# Patient Record
Sex: MALE | Race: WHITE | NOT HISPANIC OR LATINO | Employment: OTHER | URBAN - METROPOLITAN AREA
[De-identification: names, ages, dates, MRNs, and addresses within clinical notes are randomized per-mention and may not be internally consistent; named-entity substitution may affect disease eponyms.]

---

## 2017-01-01 ENCOUNTER — ANTI-COAG VISIT (OUTPATIENT)
Dept: CARDIOLOGY | Facility: CLINIC | Age: 82
End: 2017-01-01

## 2017-01-01 ENCOUNTER — CLINICAL SUPPORT (OUTPATIENT)
Dept: CARDIOLOGY | Facility: CLINIC | Age: 82
End: 2017-01-01
Payer: MEDICARE

## 2017-01-01 ENCOUNTER — LAB VISIT (OUTPATIENT)
Dept: LAB | Facility: HOSPITAL | Age: 82
End: 2017-01-01
Attending: INTERNAL MEDICINE
Payer: MEDICARE

## 2017-01-01 ENCOUNTER — HOSPITAL ENCOUNTER (OUTPATIENT)
Dept: RADIOLOGY | Facility: HOSPITAL | Age: 82
Discharge: HOME | End: 2017-03-21
Attending: UROLOGY
Payer: MEDICARE

## 2017-01-01 ENCOUNTER — TELEPHONE (OUTPATIENT)
Dept: UROLOGY | Facility: CLINIC | Age: 82
End: 2017-01-01

## 2017-01-01 ENCOUNTER — TELEPHONE (OUTPATIENT)
Dept: CARDIOLOGY | Facility: CLINIC | Age: 82
End: 2017-01-01

## 2017-01-01 ENCOUNTER — OFFICE VISIT (OUTPATIENT)
Dept: CARDIOLOGY | Facility: CLINIC | Age: 82
End: 2017-01-01
Payer: MEDICARE

## 2017-01-01 ENCOUNTER — OFFICE VISIT (OUTPATIENT)
Dept: UROLOGY | Facility: CLINIC | Age: 82
End: 2017-01-01
Attending: UROLOGY
Payer: MEDICARE

## 2017-01-01 VITALS
SYSTOLIC BLOOD PRESSURE: 133 MMHG | HEART RATE: 68 BPM | BODY MASS INDEX: 20.57 KG/M2 | HEIGHT: 72 IN | DIASTOLIC BLOOD PRESSURE: 60 MMHG | WEIGHT: 151.88 LBS

## 2017-01-01 VITALS
HEART RATE: 69 BPM | SYSTOLIC BLOOD PRESSURE: 159 MMHG | WEIGHT: 155.63 LBS | HEIGHT: 72 IN | BODY MASS INDEX: 21.08 KG/M2 | DIASTOLIC BLOOD PRESSURE: 59 MMHG

## 2017-01-01 DIAGNOSIS — Z51.81 ANTICOAGULATION MANAGEMENT ENCOUNTER: ICD-10-CM

## 2017-01-01 DIAGNOSIS — Z79.01 ANTICOAGULATION MANAGEMENT ENCOUNTER: ICD-10-CM

## 2017-01-01 DIAGNOSIS — I71.40 ABDOMINAL AORTIC ANEURYSM (AAA) WITHOUT RUPTURE: ICD-10-CM

## 2017-01-01 DIAGNOSIS — N28.1 RENAL CYST: ICD-10-CM

## 2017-01-01 DIAGNOSIS — Z95.3 HISTORY OF AORTIC VALVE REPLACEMENT WITH BIOPROSTHETIC VALVE: ICD-10-CM

## 2017-01-01 DIAGNOSIS — I10 ESSENTIAL HYPERTENSION: ICD-10-CM

## 2017-01-01 DIAGNOSIS — I25.10 CORONARY ARTERY DISEASE WITHOUT ANGINA PECTORIS, UNSPECIFIED VESSEL OR LESION TYPE, UNSPECIFIED WHETHER NATIVE OR TRANSPLANTED HEART: ICD-10-CM

## 2017-01-01 DIAGNOSIS — Z95.0 CARDIAC PACEMAKER IN SITU: ICD-10-CM

## 2017-01-01 DIAGNOSIS — E78.00 HYPERCHOLESTEROLEMIA: ICD-10-CM

## 2017-01-01 DIAGNOSIS — F68.8: ICD-10-CM

## 2017-01-01 DIAGNOSIS — I65.23 BILATERAL CAROTID ARTERY STENOSIS: ICD-10-CM

## 2017-01-01 DIAGNOSIS — I48.20 CHRONIC ATRIAL FIBRILLATION: ICD-10-CM

## 2017-01-01 DIAGNOSIS — I25.10 CORONARY ARTERY DISEASE INVOLVING NATIVE CORONARY ARTERY OF NATIVE HEART WITHOUT ANGINA PECTORIS: ICD-10-CM

## 2017-01-01 DIAGNOSIS — I48.19 PERSISTENT ATRIAL FIBRILLATION: ICD-10-CM

## 2017-01-01 DIAGNOSIS — I50.812 CHRONIC RIGHT-SIDED CONGESTIVE HEART FAILURE: ICD-10-CM

## 2017-01-01 DIAGNOSIS — Z95.5 STENTED CORONARY ARTERY: ICD-10-CM

## 2017-01-01 DIAGNOSIS — N18.6 END STAGE KIDNEY DISEASE: ICD-10-CM

## 2017-01-01 DIAGNOSIS — Z95.5 STENTED CORONARY ARTERY: Primary | ICD-10-CM

## 2017-01-01 DIAGNOSIS — I48.19 PERSISTENT ATRIAL FIBRILLATION: Chronic | ICD-10-CM

## 2017-01-01 DIAGNOSIS — I48.20 CHRONIC ATRIAL FIBRILLATION: Primary | ICD-10-CM

## 2017-01-01 DIAGNOSIS — N40.0 BENIGN NON-NODULAR PROSTATIC HYPERPLASIA WITHOUT LOWER URINARY TRACT SYMPTOMS: Primary | ICD-10-CM

## 2017-01-01 DIAGNOSIS — Z95.0 CARDIAC PACEMAKER IN SITU: Primary | ICD-10-CM

## 2017-01-01 LAB
ALBUMIN SERPL BCP-MCNC: 3.4 G/DL
ALP SERPL-CCNC: 132 U/L
ALT SERPL W/O P-5'-P-CCNC: 15 U/L
ANION GAP SERPL CALC-SCNC: 14 MMOL/L
AORTIC VALVE STENOSIS: ABNORMAL
AST SERPL-CCNC: 23 U/L
BASOPHILS # BLD AUTO: 0.04 K/UL
BASOPHILS NFR BLD: 0.7 %
BILIRUB SERPL-MCNC: 0.7 MG/DL
BUN SERPL-MCNC: 54 MG/DL
CALCIUM SERPL-MCNC: 9.3 MG/DL
CHLORIDE SERPL-SCNC: 96 MMOL/L
CHOLEST/HDLC SERPL: 1.8 {RATIO}
CO2 SERPL-SCNC: 28 MMOL/L
CREAT SERPL-MCNC: 7.1 MG/DL
DIFFERENTIAL METHOD: ABNORMAL
EOSINOPHIL # BLD AUTO: 0.1 K/UL
EOSINOPHIL NFR BLD: 2.1 %
ERYTHROCYTE [DISTWIDTH] IN BLOOD BY AUTOMATED COUNT: 15.4 %
EST. GFR  (AFRICAN AMERICAN): 7.5 ML/MIN/1.73 M^2
EST. GFR  (NON AFRICAN AMERICAN): 6.5 ML/MIN/1.73 M^2
ESTIMATED PA SYSTOLIC PRESSURE: 41.44
GLUCOSE SERPL-MCNC: 88 MG/DL
HCT VFR BLD AUTO: 33 %
HDL/CHOLESTEROL RATIO: 55.4 %
HDLC SERPL-MCNC: 112 MG/DL
HDLC SERPL-MCNC: 62 MG/DL
HGB BLD-MCNC: 10.5 G/DL
INR PPP: 1.7 (ref 2–3)
INR PPP: 1.9 (ref 2–3)
INR PPP: 2.3 (ref 2–3)
INR PPP: 2.8 (ref 2–3)
LDLC SERPL CALC-MCNC: 40.4 MG/DL
LYMPHOCYTES # BLD AUTO: 1.7 K/UL
LYMPHOCYTES NFR BLD: 27.3 %
MCH RBC QN AUTO: 33.5 PG
MCHC RBC AUTO-ENTMCNC: 31.8 %
MCV RBC AUTO: 105 FL
MITRAL VALVE MOBILITY: ABNORMAL
MITRAL VALVE REGURGITATION: ABNORMAL
MONOCYTES # BLD AUTO: 0.9 K/UL
MONOCYTES NFR BLD: 13.9 %
NEUTROPHILS # BLD AUTO: 3.4 K/UL
NEUTROPHILS NFR BLD: 55.8 %
NONHDLC SERPL-MCNC: 50 MG/DL
PLATELET # BLD AUTO: 138 K/UL
PMV BLD AUTO: 11 FL
POTASSIUM SERPL-SCNC: 5.2 MMOL/L
PROT SERPL-MCNC: 7.6 G/DL
RBC # BLD AUTO: 3.13 M/UL
RETIRED EF AND QEF - SEE NOTES: 55 (ref 55–65)
SODIUM SERPL-SCNC: 138 MMOL/L
TRIGL SERPL-MCNC: 48 MG/DL
TSH SERPL DL<=0.005 MIU/L-ACNC: 1.7 UIU/ML
WBC # BLD AUTO: 6.11 K/UL

## 2017-01-01 PROCEDURE — 76770 US EXAM ABDO BACK WALL COMP: CPT | Mod: 26,,, | Performed by: RADIOLOGY

## 2017-01-01 PROCEDURE — 1126F AMNT PAIN NOTED NONE PRSNT: CPT | Mod: ,,, | Performed by: INTERNAL MEDICINE

## 2017-01-01 PROCEDURE — 99214 OFFICE O/P EST MOD 30 MIN: CPT | Mod: S$PBB,,, | Performed by: INTERNAL MEDICINE

## 2017-01-01 PROCEDURE — 93279 PRGRMG DEV EVAL PM/LDLS PM: CPT | Mod: PBBFAC,PO | Performed by: INTERNAL MEDICINE

## 2017-01-01 PROCEDURE — 93306 TTE W/DOPPLER COMPLETE: CPT | Mod: PBBFAC,PO | Performed by: INTERNAL MEDICINE

## 2017-01-01 PROCEDURE — 76770 US EXAM ABDO BACK WALL COMP: CPT | Mod: TC,PO

## 2017-01-01 PROCEDURE — 99999 PR PBB SHADOW E&M-EST. PATIENT-LVL III: CPT | Mod: PBBFAC,,, | Performed by: INTERNAL MEDICINE

## 2017-01-01 PROCEDURE — 99213 OFFICE O/P EST LOW 20 MIN: CPT | Mod: S$PBB,,, | Performed by: UROLOGY

## 2017-01-01 PROCEDURE — 99999 PR PBB SHADOW E&M-EST. PATIENT-LVL III: CPT | Mod: PBBFAC,,, | Performed by: UROLOGY

## 2017-01-01 PROCEDURE — 99213 OFFICE O/P EST LOW 20 MIN: CPT | Mod: PBBFAC,PO | Performed by: INTERNAL MEDICINE

## 2017-01-01 PROCEDURE — 1159F MED LIST DOCD IN RCRD: CPT | Mod: ,,, | Performed by: INTERNAL MEDICINE

## 2017-01-01 PROCEDURE — 99213 OFFICE O/P EST LOW 20 MIN: CPT | Mod: PBBFAC | Performed by: UROLOGY

## 2017-01-01 RX ORDER — WARFARIN 2.5 MG/1
2.5 TABLET ORAL DAILY
Qty: 90 TABLET | Refills: 3 | Status: SHIPPED | OUTPATIENT
Start: 2017-01-01

## 2017-01-01 RX ORDER — VALSARTAN 80 MG/1
TABLET ORAL
Qty: 180 TABLET | Refills: 4 | Status: SHIPPED | OUTPATIENT
Start: 2017-01-01 | End: 2018-01-01

## 2017-01-01 RX ORDER — VALSARTAN 80 MG/1
TABLET ORAL
Qty: 180 TABLET | Refills: 0 | Status: ON HOLD | OUTPATIENT
Start: 2017-01-01 | End: 2017-01-01 | Stop reason: HOSPADM

## 2017-01-01 RX ORDER — TAMSULOSIN HYDROCHLORIDE 0.4 MG/1
CAPSULE ORAL
Qty: 90 CAPSULE | Refills: 3 | Status: SHIPPED | OUTPATIENT
Start: 2017-01-01 | End: 2017-01-01

## 2017-01-01 RX ORDER — FINASTERIDE 5 MG/1
TABLET, FILM COATED ORAL
Qty: 90 TABLET | Refills: 0 | Status: SHIPPED | OUTPATIENT
Start: 2017-01-01 | End: 2017-01-01

## 2017-01-01 RX ORDER — METOPROLOL SUCCINATE 50 MG/1
TABLET, EXTENDED RELEASE ORAL
Qty: 180 TABLET | Refills: 0 | Status: ON HOLD | OUTPATIENT
Start: 2017-01-01 | End: 2017-01-01 | Stop reason: CLARIF

## 2017-01-01 RX ORDER — METOPROLOL SUCCINATE 50 MG/1
TABLET, EXTENDED RELEASE ORAL
Qty: 180 TABLET | Refills: 0 | Status: SHIPPED | OUTPATIENT
Start: 2017-01-01 | End: 2017-01-01 | Stop reason: SDUPTHER

## 2017-01-01 RX ORDER — VALSARTAN 80 MG/1
TABLET ORAL
Qty: 180 TABLET | Refills: 0 | Status: SHIPPED | OUTPATIENT
Start: 2017-01-01 | End: 2017-01-01 | Stop reason: SDUPTHER

## 2017-01-17 ENCOUNTER — CLINICAL SUPPORT (OUTPATIENT)
Dept: CARDIOLOGY | Facility: CLINIC | Age: 82
End: 2017-01-17
Payer: MEDICARE

## 2017-01-17 DIAGNOSIS — I49.8 PACEMAKER-DEPENDENT DUE TO NATIVE CARDIAC RHYTHM INSUFFICIENT TO SUPPORT LIFE: ICD-10-CM

## 2017-01-17 DIAGNOSIS — Z95.0 PACEMAKER-DEPENDENT DUE TO NATIVE CARDIAC RHYTHM INSUFFICIENT TO SUPPORT LIFE: ICD-10-CM

## 2017-01-17 DIAGNOSIS — I48.20 CHRONIC ATRIAL FIBRILLATION: ICD-10-CM

## 2017-01-17 PROCEDURE — 93279 PRGRMG DEV EVAL PM/LDLS PM: CPT | Mod: PBBFAC,PO | Performed by: INTERNAL MEDICINE

## 2017-01-27 NOTE — TELEPHONE ENCOUNTER
----- Message from Ari Hale sent at 1/27/2017  4:23 PM CST -----  Contact: Mariann  Inquiring if the us on March is fasting or [phone]

## 2017-03-27 NOTE — TELEPHONE ENCOUNTER
----- Message from Chris Parrish sent at 3/27/2017  3:02 PM CDT -----  Contact: pt  x_ 1st Request   _ 2nd Request   _ 3rd Request     Who: SUKHWINDER MONROE [[id number]]    Why: pt is requesting a call back in reference to his 3/28 appointment.  Pt is in dialysis until 2:00 and is requesting to be seen after 2:00.  Please call patient.    What Number to Call Back: [phone]    When to Expect a call back: (Before the end of the day)   -- if call after 3:00 call back will be tomorrow.

## 2017-03-27 NOTE — TELEPHONE ENCOUNTER
----- Message from Trang White sent at 3/27/2017  3:34 PM CDT -----  Contact: Mariann Dominguez (Spouse)  X   1st Request  _  2nd Request  _  3rd Request        Who: Mariann Dominguez (Spouse)    Why: Pt's wife wants to verify that the pt is going to be seen on 03/28 by Dr. Reyes. Please call, thanks!    What Number to Call Back:  [phone]    When to Expect a call back: (Before the end of the day)   -- if the call is after 12:00, the call back will be tomorrow.

## 2017-03-28 PROBLEM — N28.1 RENAL CYST: Status: ACTIVE | Noted: 2017-01-01

## 2017-03-28 NOTE — PROGRESS NOTES
Subjective:      Tyrell Dominguez is a 81 y.o. male who returns today regarding his enlarged prostate and renal cyst.      The patient is currently on dialysis for his CKD. Overall he is doing very well.      The patient is currently on flomax and proscar for his enlarged prostate. The patient only occasionally makes urine now that he is on dialysis for over a year. We have chosen to stop following his PSA.   No symptoms related to the renal cyst. He denies hematuria, flank pain or abdominal pain. Renal ultrasound (3/17) showed an unchanged septated right lower pole renal cyst suggestive of category 2F cyst, unchanged from 3/3/16.     The following portions of the patient's history were reviewed and updated as appropriate: allergies, current medications, past family history, past medical history, past social history, past surgical history and problem list.    Review of Systems  Pertinent items are noted in HPI.  A comprehensive multipoint review of systems was negative except as otherwise stated in the HPI.     Objective:   Vitals: /60 (BP Location: Left arm, Patient Position: Sitting, BP Method: Automatic)  Pulse 68  Ht 6' (1.829 m)  Wt 68.9 kg (151 lb 14.4 oz)  BMI 20.6 kg/m2    Physical Exam   General: alert and oriented, no acute distress  Respiratory: Symmetric expansion, non-labored breathing  Cardiovascular: no peripheral edema  Abdomen: , non distended  Skin: normal coloration and turgor, no rashes, no suspicious skin lesions noted  Neuro: no gross deficits  Psych: normal judgment and insight, normal mood/affect and non-anxious  Prostate is 30 grams; firm bilaterally; no nodules or indurations (we discussed biopsying; given the overall situation I don't think this is appropriate at this time)   Physical Exam    Lab Review   Urinalysis demonstrates : no sample today   Lab Results   Component Value Date    WBC 6.02 08/02/2016    HGB 10.2 (L) 08/02/2016    HCT 32.3 (L) 08/02/2016     (H)  "08/02/2016    PLT 78 (L) 08/02/2016     Lab Results   Component Value Date    CREATININE 7.25 (H) 07/18/2016    BUN 75 (H) 07/18/2016       Imaging  All images were personally reviewed and agree with the findings:   Renal ultrasound (3/17)- "1.  Septated right lower pole renal cyst suggestive of a category 2F cyst unchanged since 3/3/16. 2. Cortical thinning bilaterally as can be seen with chronic medical renal disease. 3. Bladder wall thickening with trabeculation this can be seen with postobstructive change, urinary tract infection or neurogenic bladder. Please clinically correlate. 4. Cholelithiasis with suboptimal evaluation of the gallbladder, if better gallbladder evaluation is desired a fasting examination could be performed. Gallbladder wall thickening is noted as can be seen with an inflammatory process or lack of distention from recent alimentation or prolonged fasting."     Assessment and Plan:   Tyrell was seen today for follow-up.    Diagnoses and all orders for this visit:    Benign non-nodular prostatic hyperplasia without lower urinary tract symptoms    Renal cyst    ESRD On MWF HD    Plan: Renal cyst is unchanged. The patient is doing well. Discontinue finasteride and flomax as he is not producing urine now with dialysis. Return to clinic 1 year with renal ultrasound and for repeat prostate examination. No further PSA testing indicated.     "

## 2017-03-28 NOTE — MR AVS SNAPSHOT
[facility]  [address] [place] [address], [facility] [address]  [facility] [address]  Phone: [phone]                  Tyrell Dominguez   3/28/2017 2:45 PM   Office Visit    Description:  Male : 1935   Provider:  Pete Carty MD   Department:  [facility]           Reason for Visit     Follow-up           Diagnoses this Visit        Comments    Benign non-nodular prostatic hyperplasia without lower urinary tract symptoms    -  Primary     Renal cyst         End stage kidney disease                To Do List           Future Appointments        Provider Department Dept Phone    2017 7:45 AM ECHO, [facility] [phone]    2017 8:30 AM LAB, [place] [facility] [phone]    2017 11:20 AM Larry Knox MD [facility] [phone]      Goals (5 Years of Data)     None      Follow-Up and Disposition     Return in about 1 year (around 3/28/2018).    Follow-up and Disposition History      [facility]     [facility] -  Assistance  Registered nurses in the [facility] provide clinical advisement, health education, appointment booking, and other advisory services.  Call for this free service at [phone].             Medications           Message regarding Medications     Verify the changes and/or additions to your medication regime listed below are the same as discussed with your clinician today.  If any of these changes or additions are incorrect, please notify your healthcare provider.        STOP taking these medications     hydrocodone-acetaminophen 5-325mg (NORCO) 5-325 mg per tablet Take 1 tablet by mouth every 6 (six) hours as needed for Pain.    tamsulosin (FLOMAX) 0.4 mg Cp24 Take 1 capsule (0.4 mg total) by mouth every evening.    finasteride (PROSCAR) 5 mg tablet Take 1 tablet (5 mg total) by mouth once daily.           Verify that the below list of medications is an accurate  representation of the medications you are currently taking.  If none reported, the list may be blank. If incorrect, please contact your healthcare provider. Carry this list with you in case of emergency.           Current Medications     cloNIDine (CATAPRES) 0.2 MG tablet Take 0.2 mg by mouth once. Take only on non-dialysis days    FOLBEE PLUS 5 mg Tab TK 1 T PO  QHS    metoprolol succinate (TOPROL-XL) 50 MG 24 hr tablet TAKE 2 TABLETS BY MOUTH DAILY    pantoprazole (PROTONIX) 40 MG tablet Take 40 mg by mouth 2 (two) times daily.    triamcinolone acetonide 0.1% (KENALOG) 0.1 % cream TERRENCE AA BID    valsartan (DIOVAN) 80 MG tablet Take 2 tablets (160 mg total) by mouth every evening.    warfarin (COUMADIN) 2.5 MG tablet Take by mouth. 5 mg Wed Sat, 2.5 mg all other days.    acetaminophen (TYLENOL) 650 MG TbSR Take 1 tablet (650 mg total) by mouth 2 (two) times daily as needed.    aspirin 81 MG Chew Take 1 tablet (81 mg total) by mouth once daily.    atorvastatin (LIPITOR) 20 MG tablet Take 1 tablet (20 mg total) by mouth nightly.    baclofen (LIORESAL) 10 MG tablet Take 1/2 to 1 tablet at bedtime if needed for muscle cramps    docusate sodium (COLACE) 100 MG capsule Take 100 mg by mouth 2 (two) times daily as needed for Constipation.    sevelamer carbonate (RENVELA) 800 mg Tab Take 1 tablet (800 mg total) by mouth 2 (two) times daily with meals.           Clinical Reference Information           Your Vitals Were     BP Pulse Height Weight BMI    133/60 (BP Location: Left arm, Patient Position: Sitting, BP Method: Automatic) 68 6' (1.829 m) 68.9 kg (151 lb 14.4 oz) 20.6 kg/m2      Blood Pressure          Most Recent Value    BP  133/60      Allergies as of 3/28/2017     No Known Allergies      Immunizations Administered on Date of Encounter - 3/28/2017     None      Orders Placed During Today's Visit     Future Labs/Procedures Expected by Expires    US Kidney Only  3/28/2018 9/28/2018      Language Assistance Services      ATTENTION: Language assistance services are available, free of charge. Please call [phone].      ATENCIÓN: Si habla ama, tiene a anderson disposición servicios gratuitos de asistencia lingüística. Llame al [phone].     CHÚ Ý: N?u b?n nói Ti?ng Vi?t, có các d?ch v? h? tr? ngôn ng? mi?n phí dành cho b?n. G?i s? [phone].         [facility] complies with applicable Federal civil rights laws and does not discriminate on the basis of race, color, national origin, age, disability, or sex.

## 2017-06-20 NOTE — PROGRESS NOTES
Subjective:    Patient ID:  Tyrell Dominguez is a 82 y.o. male who presents for follow-up of Hypertension (8 month f/u - review echo and labs )      HPI   Here for follow up of CABG-PCI/AVR/PPM/bilateral CEA/persistant AF/ESRD on HD 3x/wk . Doing well occasional hypotension with HD. No angina. Fell has fx c-spine    Review of Systems   Constitution: Positive for weakness and malaise/fatigue. Negative for decreased appetite.   HENT: Negative for congestion and nosebleeds.    Eyes: Negative for blurred vision.   Cardiovascular: Negative for chest pain, claudication, cyanosis, dyspnea on exertion, irregular heartbeat, leg swelling, near-syncope, orthopnea, palpitations, paroxysmal nocturnal dyspnea and syncope.   Respiratory: Negative for cough and shortness of breath.    Endocrine: Negative for polyuria.   Hematologic/Lymphatic: Does not bruise/bleed easily.   Musculoskeletal: Negative for back pain, falls, joint pain, joint swelling, muscle cramps, muscle weakness and myalgias.   Gastrointestinal: Negative for bloating, abdominal pain, change in bowel habit, nausea and vomiting.   Genitourinary: Negative for urgency.   Neurological: Negative for dizziness, focal weakness and light-headedness.   Psychiatric/Behavioral: Negative for altered mental status.        Objective:    Physical Exam   Constitutional: He is oriented to person, place, and time. He appears well-developed and well-nourished. He is cooperative.   HENT:   Head: Normocephalic and atraumatic.   Eyes: Conjunctivae are normal. Right eye exhibits no exudate. Left eye exhibits no exudate.   Neck: Normal range of motion. Neck supple. Normal carotid pulses and no JVD present. Carotid bruit is not present. No thyromegaly present.   Bilateral CEA scars   Cardiovascular: Normal rate, regular rhythm, normal heart sounds and intact distal pulses.    Pulses:       Carotid pulses are 2+ on the right side, and 2+ on the left side.       Radial pulses are 2+ on the  right side, and 2+ on the left side.        Dorsalis pedis pulses are 2+ on the right side, and 2+ on the left side.        Posterior tibial pulses are 2+ on the right side, and 2+ on the left side.   Well healed midline sternal incision.  Pacer site clean and dry, well healed.   Pulmonary/Chest: Effort normal and breath sounds normal.   Abdominal: Soft. Bowel sounds are normal.   Musculoskeletal: Normal range of motion. He exhibits no edema.   Neurological: He is alert and oriented to person, place, and time. Gait normal.   Skin: Skin is warm, dry and intact. No cyanosis. Nails show no clubbing.   Psychiatric: He has a normal mood and affect. His speech is normal and behavior is normal. Judgment and thought content normal.             ..    Chemistry        Component Value Date/Time     06/14/2017 0757    K 5.2 (H) 06/14/2017 0757    CL 96 06/14/2017 0757    CO2 28 06/14/2017 0757    BUN 54 (H) 06/14/2017 0757    CREATININE 7.1 (H) 06/14/2017 0757    GLU 88 06/14/2017 0757        Component Value Date/Time    CALCIUM 9.3 06/14/2017 0757    ALKPHOS 132 06/14/2017 0757    AST 23 06/14/2017 0757    AST 28 12/04/2015 1345    ALT 15 06/14/2017 0757    BILITOT 0.7 06/14/2017 0757            ..  Lab Results   Component Value Date    CHOL 112 (L) 06/14/2017    CHOL 103 (L) 06/22/2016    CHOL 100 (L) 04/19/2016     Lab Results   Component Value Date    HDL 62 06/14/2017    HDL 52 06/22/2016    HDL 57 04/19/2016     Lab Results   Component Value Date    LDLCALC 40.4 (L) 06/14/2017    LDLCALC 43.2 (L) 06/22/2016    LDLCALC 35.4 (L) 04/19/2016     Lab Results   Component Value Date    TRIG 48 06/14/2017    TRIG 39 06/22/2016    TRIG 38 04/19/2016     Lab Results   Component Value Date    CHOLHDL 55.4 (H) 06/14/2017    CHOLHDL 50.5 (H) 06/22/2016    CHOLHDL 57.0 (H) 04/19/2016     ..  Lab Results   Component Value Date    WBC 6.11 06/14/2017    HGB 10.5 (L) 06/14/2017    HCT 33.0 (L) 06/14/2017     (H) 06/14/2017      (L) 06/14/2017       Test(s) Reviewed  I have reviewed the following in detail:  [] Stress test   [] Angiography   [x] Echocardiogram   [x] Labs   [x] Other:       Assessment:         ICD-10-CM ICD-9-CM   1. Stented coronary artery Z95.5 V45.82   2. Abdominal aortic aneurysm (AAA) without rupture I71.4 441.4   3. Coronary artery disease involving native coronary artery of native heart without angina pectoris I25.10 414.01   4. Bilateral LENNY S/P Bilateral CEAs I65.23 433.10     433.30   5. Chronic Atrial Fibrillation S/P PPM On Chronic Warfarin I48.2 427.31   6. Chronic Right Ventricle CHF I50.9 428.0   7. Essential hypertension I10 401.9   8. Cardiac pacemaker in situ Z95.0 V45.01   9. Hypercholesterolemia E78.00 272.0   10. 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 Larry Knox         Hypercholesterolemia    Stented coronary artery - Primary      Other Visit Diagnoses    None.          Plan:           Return to clinic 1 year   Low level/low impact aerobic exercise 5x's/wk. Heart healthy diet and risk factor modification.    See labs and med orders.  Fall precautions  Educated/discussed leg elevation and decreased salt intake.   CFD next year follow  THONY

## 2017-08-23 PROBLEM — K85.90 ACUTE PANCREATITIS: Status: ACTIVE | Noted: 2017-01-01

## 2017-08-24 PROBLEM — K85.00 IDIOPATHIC ACUTE PANCREATITIS WITHOUT INFECTION OR NECROSIS: Status: ACTIVE | Noted: 2017-01-01

## 2017-08-26 PROBLEM — A41.9 SEVERE SEPSIS: Status: ACTIVE | Noted: 2017-01-01

## 2017-08-26 PROBLEM — R65.20 SEVERE SEPSIS: Status: ACTIVE | Noted: 2017-01-01

## 2017-08-26 PROBLEM — I48.91 ATRIAL FIBRILLATION WITH RVR: Status: ACTIVE | Noted: 2017-01-01

## 2017-08-27 PROBLEM — I48.20 CHRONIC ATRIAL FIBRILLATION WITH RVR: Status: ACTIVE | Noted: 2017-01-01

## 2017-08-28 PROBLEM — R79.89 ELEVATED TROPONIN I LEVEL: Status: ACTIVE | Noted: 2017-01-01

## 2017-08-28 PROBLEM — K85.10 ACUTE BILIARY PANCREATITIS WITHOUT INFECTION OR NECROSIS: Status: ACTIVE | Noted: 2017-01-01

## 2017-08-29 PROBLEM — I71.40 ABDOMINAL AORTIC ANEURYSM (AAA) WITHOUT RUPTURE: Status: ACTIVE | Noted: 2017-01-01

## 2017-09-01 PROBLEM — K80.10 CALCULUS OF GALLBLADDER WITH CHRONIC CHOLECYSTITIS WITHOUT OBSTRUCTION: Status: ACTIVE | Noted: 2017-01-01

## 2017-10-30 NOTE — TELEPHONE ENCOUNTER
----- Message from Torrie Whitley sent at 10/30/2017  2:09 PM CDT -----  Wife/Mariann is calling concerning patient's heart medication. She states that she needs to speak to someone today. Please call back for details at [phone].

## 2017-10-30 NOTE — TELEPHONE ENCOUNTER
Spoke to wife, informed her as of the Discharge Medication List 9/3/2017 10:10 AM from hospital visit  Following meds has been discontinue:  cloNIDine (CATAPRES) 0.2 MG tablet Comments:   Reason for Stopping:        valsartan (DIOVAN) 80 MG tablet Comments:   Reason for Stopping:     Wife verbalized understanding.

## 2017-12-04 NOTE — TELEPHONE ENCOUNTER
----- Message from Merissa Santoro sent at 12/4/2017  3:09 PM CST -----  Contact: Patient's Wife, Mariann  Patient's Wife, Mariann Dominguez, called advising that the patient is completely out of his warfarin (COUMADIN) 2.5 MG tablet and needs a refill as soon as possible.  Medication Name:  warfarin (COUMADIN) 2.5 MG  Preferred pharmacy:  [facility] on [address]  First time calling about this refill (y/n): Yes, but advised [facility] had been faxing the wrong doctor  Call Back Number:  [phone]

## 2017-12-06 PROBLEM — I83.019 VENOUS ULCER OF RIGHT LEG: Status: ACTIVE | Noted: 2017-01-01

## 2017-12-06 PROBLEM — L97.919 VENOUS ULCER OF RIGHT LEG: Status: ACTIVE | Noted: 2017-01-01

## 2017-12-06 PROBLEM — L97.911 NON-PRESSURE CHRONIC ULCER OF RIGHT LOWER LEG, LIMITED TO BREAKDOWN OF SKIN: Status: ACTIVE | Noted: 2017-01-01

## 2017-12-06 PROBLEM — S91.104A OPEN WOUND OF SECOND TOE OF RIGHT FOOT: Status: ACTIVE | Noted: 2017-01-01

## 2017-12-06 PROBLEM — R60.0 BILATERAL LEG EDEMA: Status: ACTIVE | Noted: 2017-01-01

## 2018-01-01 ENCOUNTER — TELEPHONE (OUTPATIENT)
Dept: VASCULAR SURGERY | Facility: CLINIC | Age: 83
End: 2018-01-01

## 2018-01-19 NOTE — TELEPHONE ENCOUNTER
----- Message from Main Snyder sent at 1/19/2018 11:06 AM CST -----  Contact: [facility]  Dr. Poly Sánchez will like for patient to have a Fistulagram please call back at [phone]